# Patient Record
(demographics unavailable — no encounter records)

---

## 2025-03-26 NOTE — HISTORY OF PRESENT ILLNESS
[de-identified] : Very pleasant 74-year-old gentleman well-known to me comes in for his right elbow.  This been going for some time.  Pain is mostly posterior.  He said he had a procedure done several years ago in the operating room without an incision that sounds like a Tenex procedure.  Currently main complaint is pain and discomfort when he plays golf as well as when he skis and while he activates the back of his elbow.

## 2025-03-26 NOTE — DISCUSSION/SUMMARY
[de-identified] : I had a long discussion with the patient about the nature of their condition and all available treatment options. We discussed operative and non-operative interventions. After a long discussion, they would like to proceed with structured physical therapy. We also discussed use of anti-inflammatory medications as well as their risks and benefits. I will see them back in approximately 6 weeks time. If they are still having pain, I would recommend an MRI. All of their questions were answered, they agree with the treatment plan we would likely consider a PRP injection as well if he wanted to.  At some point he may benefit from muscle excising this piece of bone as well as repairing the triceps tendon which he understands.

## 2025-03-26 NOTE — PHYSICAL EXAM
[de-identified] : Well-appearing male in no acute distress.  Alert and oriented x 3.  Peers to stated age.  Normal mood and affect.  He has good range of motion of his elbow.  He has some tenderness over the tricep region.  He has pain with resisted elbow extension.  No pain with resisted wrist extension and forearm supination.  No tenderness over the ulnar nerve.  Denies any numbness or tingling right upper extremity. [de-identified] : Multiple views right elbow ordered and reviewed.  These demonstrate a very large traction spur off the olecranon likely damaging the triceps tendon

## 2025-03-26 NOTE — PHYSICAL EXAM
[de-identified] : Well-appearing male in no acute distress.  Alert and oriented x 3.  Peers to stated age.  Normal mood and affect.  He has good range of motion of his elbow.  He has some tenderness over the tricep region.  He has pain with resisted elbow extension.  No pain with resisted wrist extension and forearm supination.  No tenderness over the ulnar nerve.  Denies any numbness or tingling right upper extremity. [de-identified] : Multiple views right elbow ordered and reviewed.  These demonstrate a very large traction spur off the olecranon likely damaging the triceps tendon

## 2025-03-26 NOTE — DISCUSSION/SUMMARY
[de-identified] : I had a long discussion with the patient about the nature of their condition and all available treatment options. We discussed operative and non-operative interventions. After a long discussion, they would like to proceed with structured physical therapy. We also discussed use of anti-inflammatory medications as well as their risks and benefits. I will see them back in approximately 6 weeks time. If they are still having pain, I would recommend an MRI. All of their questions were answered, they agree with the treatment plan we would likely consider a PRP injection as well if he wanted to.  At some point he may benefit from muscle excising this piece of bone as well as repairing the triceps tendon which he understands.

## 2025-03-26 NOTE — HISTORY OF PRESENT ILLNESS
[de-identified] : Very pleasant 74-year-old gentleman well-known to me comes in for his right elbow.  This been going for some time.  Pain is mostly posterior.  He said he had a procedure done several years ago in the operating room without an incision that sounds like a Tenex procedure.  Currently main complaint is pain and discomfort when he plays golf as well as when he skis and while he activates the back of his elbow.